# Patient Record
Sex: MALE | Race: WHITE | NOT HISPANIC OR LATINO | URBAN - METROPOLITAN AREA
[De-identification: names, ages, dates, MRNs, and addresses within clinical notes are randomized per-mention and may not be internally consistent; named-entity substitution may affect disease eponyms.]

---

## 2020-07-25 ENCOUNTER — TELEPHONE ENCOUNTER (OUTPATIENT)
Dept: URBAN - METROPOLITAN AREA CLINIC 13 | Facility: CLINIC | Age: 55
End: 2020-07-25

## 2020-07-25 RX ORDER — SUCRALFATE 1 G/1
TAKE 1 TABLET 4 TIMES DAILY, BEFORE MEALS AND AT BEDTIME TABLET ORAL
Qty: 120 | Refills: 3 | OUTPATIENT
Start: 2012-03-16 | End: 2017-02-23

## 2020-07-25 RX ORDER — POLYETHYLENE GLYCOL 3350, SODIUM CHLORIDE, SODIUM BICARBONATE AND POTASSIUM CHLORIDE WITH LEMON FLAVOR 420; 11.2; 5.72; 1.48 G/4L; G/4L; G/4L; G/4L
TAKE 1/2 GALLON AT 5:00 PM DAY BEFORE PROCEDURE, TAKE SECOND 1/2 OF GALLON 6 HRS PRIOR TO PROCEDURE POWDER, FOR SOLUTION ORAL
Qty: 1 | Refills: 0 | OUTPATIENT
Start: 2017-10-05 | End: 2018-08-29

## 2020-07-25 RX ORDER — METOPROLOL TARTRATE 75 MG/1
TAKE 1 TABLET DAILY 1 MG TABLET, FILM COATED ORAL
Refills: 0 | OUTPATIENT
End: 2018-12-28

## 2020-07-25 RX ORDER — HYOSCYAMINE SULFATE 0.12 MG/1
PLACE 1 TABLET EVERY 6 HOURS PRN ABDOMINAL PAIN PLACE UNDER TONGUE TABLET, ORALLY DISINTEGRATING ORAL
Qty: 90 | Refills: 3 | OUTPATIENT
Start: 2012-10-19 | End: 2012-11-16

## 2020-07-25 RX ORDER — AMLODIPINE BESYLATE 2.5 MG
TAKE 1 TABLET DAILY TABLET ORAL
Refills: 0 | OUTPATIENT
End: 2018-08-29

## 2020-07-26 ENCOUNTER — TELEPHONE ENCOUNTER (OUTPATIENT)
Dept: URBAN - METROPOLITAN AREA CLINIC 13 | Facility: CLINIC | Age: 55
End: 2020-07-26

## 2020-07-26 RX ORDER — METOPROLOL SUCCINATE 50 MG/1
TAKE 1 TABLET DAILY TABLET, EXTENDED RELEASE ORAL
Refills: 0 | Status: ACTIVE | COMMUNITY
Start: 2018-08-23

## 2020-07-26 RX ORDER — TESTOSTERONE CYPIONATE 200 MG/ML
USE AS DIRECTED INJECTION, SOLUTION INTRAMUSCULAR
Refills: 0 | Status: ACTIVE | COMMUNITY
Start: 2018-04-09

## 2020-07-26 RX ORDER — ESOMEPRAZOLE MAGNESIUM 40 MG
TAKE 1 CAPSULE BY MOUTH EVERY DAY CAPSULE,DELAYED RELEASE (ENTERIC COATED) ORAL
Qty: 90 | Refills: 3 | Status: ACTIVE | COMMUNITY
Start: 2012-03-16

## 2023-02-02 ENCOUNTER — OFFICE VISIT (OUTPATIENT)
Dept: URBAN - METROPOLITAN AREA CLINIC 113 | Facility: CLINIC | Age: 58
End: 2023-02-02

## 2023-02-15 ENCOUNTER — LAB OUTSIDE AN ENCOUNTER (OUTPATIENT)
Dept: URBAN - METROPOLITAN AREA CLINIC 113 | Facility: CLINIC | Age: 58
End: 2023-02-15

## 2023-02-15 ENCOUNTER — OFFICE VISIT (OUTPATIENT)
Dept: URBAN - METROPOLITAN AREA CLINIC 113 | Facility: CLINIC | Age: 58
End: 2023-02-15
Payer: COMMERCIAL

## 2023-02-15 VITALS
HEART RATE: 67 BPM | SYSTOLIC BLOOD PRESSURE: 146 MMHG | RESPIRATION RATE: 20 BRPM | WEIGHT: 245 LBS | DIASTOLIC BLOOD PRESSURE: 73 MMHG | HEIGHT: 72 IN | TEMPERATURE: 97.3 F | BODY MASS INDEX: 33.18 KG/M2

## 2023-02-15 DIAGNOSIS — K21.9 GASTROESOPHAGEAL REFLUX DISEASE WITHOUT ESOPHAGITIS: ICD-10-CM

## 2023-02-15 DIAGNOSIS — R07.89 ATYPICAL CHEST PAIN: ICD-10-CM

## 2023-02-15 DIAGNOSIS — Z12.11 COLON CANCER SCREENING: ICD-10-CM

## 2023-02-15 DIAGNOSIS — R10.12 LUQ ABDOMINAL PAIN: ICD-10-CM

## 2023-02-15 PROCEDURE — 99204 OFFICE O/P NEW MOD 45 MIN: CPT | Performed by: NURSE PRACTITIONER

## 2023-02-15 RX ORDER — ESOMEPRAZOLE MAGNESIUM 40 MG
TAKE 1 CAPSULE BY MOUTH EVERY DAY CAPSULE,DELAYED RELEASE (ENTERIC COATED) ORAL
Qty: 90 | Refills: 3 | Status: ACTIVE | COMMUNITY
Start: 2012-03-16

## 2023-02-15 RX ORDER — SUCRALFATE 1 G/1
1 TABLET TABLET ORAL
Qty: 60 | Refills: 3 | OUTPATIENT

## 2023-02-15 RX ORDER — METOPROLOL SUCCINATE 50 MG/1
TAKE 1 TABLET DAILY TABLET, EXTENDED RELEASE ORAL
Refills: 0 | Status: ACTIVE | COMMUNITY
Start: 2018-08-23

## 2023-02-15 RX ORDER — TESTOSTERONE CYPIONATE 200 MG/ML
USE AS DIRECTED INJECTION, SOLUTION INTRAMUSCULAR
Refills: 0 | Status: ACTIVE | COMMUNITY
Start: 2018-04-09

## 2023-02-15 NOTE — HPI-OTHER HISTORIES
Labs  1/5/2023:BMP normal with exception of creatinine 1.4, glucose 112.  LFTs: TB 0.4, ALP 92, ALT 61, AST 34. 12/2/2022:BMP normal with exception of creatinine 1.4, glucose 134.  LFTs: TB 1.0, , , .  CBC: WBC 12.85, hemoglobin 15.4, MCV 88, platelet 201.  Urinalysis normal with exception of small blood and 10 RBCs. CT of the abdomen and pelvis with contrast 12/2/2022:Negative for focal mass, lymphadenopathy, or inflammatory process.  Mild colonic constipation, left colonic diverticulosis, and small bowel stasis pattern.  Cholecystectomy and small fat-containing umbilical and right inguinal hernias. EGD 12/13/2012:Regular Z-line, chronic gastritis status post biopsy, normal examined duodenum, Bravo pH probe negative for pathologic reflux.  Pathology report unavailable.

## 2023-02-15 NOTE — HPI-TODAY'S VISIT:
This is a 58-year-old male with a history of asthma, hypertension, colon diverticulosis, colon cancer screening due in October 2027, GERD, and functional dyspepsia presenting for evaluation of abdominal pain. He was last seen in the office 12/28/2018 for GERD and functional dyspepsia.  He had previously been seen with complaints of postprandial palpitations following large meals.  He had undergone an unremarkable cardiac work-up.  GERD was controlled with esomeprazole 40 mg daily. He has persistent, chronic symptoms.  Once a month, for 7 days, he has pain indicated in the left anterior chest and left upper quadrant.  When this occurs, he increase his Nexium to twice a day and takes Tums.  Both of which are helpful.  When his symptoms resolve, he resumes once daily therapy.  He reports intermittent heartburn associated with these episodes.  He has persistent palpitations associated with these episodes. He had intractable nausea and vomiting in December and went to the emergency department for evaluation.  His symptoms lasted for hours.  He was asymptomatic after a few days and believes he may have had a viral illness.  He had diarrhea associated with a recent viral illness.  This has likewise resolved.  He is having regular bowel movements without red blood per rectum or melena.  He denies any other abdominal symptoms. He had a cardiac evaluation in December 2022 with Dr. Osuna.  He reports a positive treadmill stress test and a subsequent negative cardiac catheterization.

## 2023-02-28 PROBLEM — 266435005: Status: ACTIVE | Noted: 2023-02-15

## 2023-03-16 ENCOUNTER — OFFICE VISIT (OUTPATIENT)
Dept: URBAN - METROPOLITAN AREA MEDICAL CENTER 2 | Facility: MEDICAL CENTER | Age: 58
End: 2023-03-16
Payer: COMMERCIAL

## 2023-03-16 DIAGNOSIS — K31.89 ACQUIRED DEFORMITY OF DUODENUM: ICD-10-CM

## 2023-03-16 DIAGNOSIS — R07.89 ACUTE CHEST WALL PAIN: ICD-10-CM

## 2023-03-16 DIAGNOSIS — K31.7 BENIGN GASTRIC POLYP: ICD-10-CM

## 2023-03-16 DIAGNOSIS — K21.9 ACID REFLUX: ICD-10-CM

## 2023-03-16 PROCEDURE — 43239 EGD BIOPSY SINGLE/MULTIPLE: CPT | Performed by: INTERNAL MEDICINE

## 2023-03-16 RX ORDER — TESTOSTERONE CYPIONATE 200 MG/ML
USE AS DIRECTED INJECTION, SOLUTION INTRAMUSCULAR
Refills: 0 | Status: ACTIVE | COMMUNITY
Start: 2018-04-09

## 2023-03-16 RX ORDER — ESOMEPRAZOLE MAGNESIUM 40 MG
TAKE 1 CAPSULE BY MOUTH EVERY DAY CAPSULE,DELAYED RELEASE (ENTERIC COATED) ORAL
Qty: 90 | Refills: 3 | Status: ACTIVE | COMMUNITY
Start: 2012-03-16

## 2023-03-16 RX ORDER — METOPROLOL SUCCINATE 50 MG/1
TAKE 1 TABLET DAILY TABLET, EXTENDED RELEASE ORAL
Refills: 0 | Status: ACTIVE | COMMUNITY
Start: 2018-08-23

## 2023-03-16 RX ORDER — SUCRALFATE 1 G/1
1 TABLET TABLET ORAL
Qty: 60 | Refills: 3 | Status: ACTIVE | COMMUNITY

## 2023-06-15 ENCOUNTER — DASHBOARD ENCOUNTERS (OUTPATIENT)
Age: 58
End: 2023-06-15

## 2023-06-15 ENCOUNTER — OFFICE VISIT (OUTPATIENT)
Dept: URBAN - METROPOLITAN AREA CLINIC 113 | Facility: CLINIC | Age: 58
End: 2023-06-15
Payer: COMMERCIAL

## 2023-06-15 VITALS
TEMPERATURE: 97.4 F | SYSTOLIC BLOOD PRESSURE: 131 MMHG | HEART RATE: 55 BPM | RESPIRATION RATE: 20 BRPM | WEIGHT: 246.2 LBS | HEIGHT: 72 IN | BODY MASS INDEX: 33.35 KG/M2 | DIASTOLIC BLOOD PRESSURE: 81 MMHG

## 2023-06-15 DIAGNOSIS — K21.9 GASTROESOPHAGEAL REFLUX DISEASE WITHOUT ESOPHAGITIS: ICD-10-CM

## 2023-06-15 DIAGNOSIS — R74.8 ELEVATED LIVER ENZYMES: ICD-10-CM

## 2023-06-15 DIAGNOSIS — R07.89 ATYPICAL CHEST PAIN: ICD-10-CM

## 2023-06-15 PROCEDURE — 99214 OFFICE O/P EST MOD 30 MIN: CPT | Performed by: NURSE PRACTITIONER

## 2023-06-15 RX ORDER — ACEBUTOLOL HYDROCHLORIDE 200 MG/1
1 CAPSULE CAPSULE ORAL ONCE A DAY
Status: ACTIVE | COMMUNITY

## 2023-06-15 RX ORDER — TESTOSTERONE CYPIONATE 200 MG/ML
USE AS DIRECTED INJECTION, SOLUTION INTRAMUSCULAR
Refills: 0 | Status: ON HOLD | COMMUNITY
Start: 2018-04-09

## 2023-06-15 RX ORDER — ESOMEPRAZOLE MAGNESIUM 40 MG
TAKE 1 CAPSULE BY MOUTH EVERY DAY CAPSULE,DELAYED RELEASE (ENTERIC COATED) ORAL
Qty: 90 | Refills: 3 | Status: ACTIVE | COMMUNITY
Start: 2012-03-16

## 2023-06-15 RX ORDER — SUCRALFATE 1 G/1
1 TABLET TABLET ORAL
Qty: 60 | Refills: 3 | Status: ON HOLD | COMMUNITY

## 2023-06-15 RX ORDER — ATORVASTATIN CALCIUM 10 MG/1
1/2 TABLET TABLET, FILM COATED ORAL ONCE A DAY
Status: ACTIVE | COMMUNITY

## 2023-06-15 RX ORDER — METOPROLOL SUCCINATE 50 MG/1
TAKE 1 TABLET DAILY TABLET, EXTENDED RELEASE ORAL
Refills: 0 | Status: ON HOLD | COMMUNITY
Start: 2018-08-23

## 2023-06-15 NOTE — HPI-TODAY'S VISIT:
This is a 58-year-old male with a history of asthma, hypertension, colon diverticulosis, colon cancer screening due in October 2027, GERD, functional dyspepsia, left upper quadrant abdominal pain, and atypical chest pain presenting for follow-up. He was last seen 2/15/2023.  Acid reflux symptoms are usually controlled with esomeprazole 40 mg daily.  He had exacerbations of atypical chest pain, left upper quadrant pain, and heartburn lasting 7 days occurring once a month.  He increase Nexium to twice a day during these episodes and use Tums which were both helpful.  He was scheduled for an EGD.  He was prescribed Carafate for symptomatic relief during these episodes. EGD 3/16/2023:Normal esophagus, multiple gastric polyps status postbiopsy, chronic gastritis status post biopsy, normal examined duodenum.  Pathology: Antral biopsies demonstrated normal antral mucosa.  No H. pylori identified.  Gastric polyp biopsy demonstrated fundic gland polyp. He is taking esomeprazole 40 mg daily.  He reports only 1 episode of pain in his chest and left upper quadrant recently.  This was relieved with taking Tums.  Acid reflux symptoms are well controlled.  He denies other abdominal symptoms.  Nausea has resolved since he decreased atorvastatin to 1/2 tablet daily. He drinks alcohol once every 2 weeks or less.  He may have a few beverages when he drinks.  He has a prior history of liver enzyme elevation.  Review of his record reveals an evaluation in 2010 by Dr. Moncada during which he was screened for autoimmune, viral, and hereditary sources of liver disease.  This evaluation was unremarkable.  He is also concerned regarding a creatinine of 1.4 on prior labs.

## 2023-06-15 NOTE — HPI-OTHER HISTORIES
EGD 3/16/2023:Normal esophagus, multiple gastric polyps status postbiopsy, chronic gastritis status post biopsy, normal examined duodenum.  Pathology: Antral biopsies demonstrated normal antral mucosa.  No H. pylori identified.  Gastric polyp biopsy demonstrated fundic gland polyp. Labs  4/14/2023:Urinalysis normal.  BMP normal with exception of creatinine 1.4.  LFTs: TB 0.6, ALP 99, ALT 38, AST 36.  Lipid panel normal with exception of HDL 36.  PSA 0.7.  Thyroid panel normal.  CBC: WBC four 8.58, hemoglobin 14.5, MCV 89.3, platelet 256.Calculated FIB 4 - 1.28 (low risk for fibrosis) 1/5/2023:BMP normal with exception of creatinine 1.4, glucose 112.  LFTs: TB 0.4, ALP 92, ALT 61, AST 34. 12/2/2022:BMP normal with exception of creatinine 1.4, glucose 134.  LFTs: TB 1.0, , , .  CBC: WBC 12.85, hemoglobin 15.4, MCV 88, platelet 201.  Urinalysis normal with exception of small blood and 10 RBCs. CT of the abdomen and pelvis with contrast 12/2/2022:Negative for focal mass, lymphadenopathy, or inflammatory process.  Mild colonic constipation, left colonic diverticulosis, and small bowel stasis pattern.  Cholecystectomy and small fat-containing umbilical and right inguinal hernias. Liver normal. EGD 12/13/2012:Regular Z-line, chronic gastritis status post biopsy, normal examined duodenum, Bravo pH probe negative for pathologic reflux.  Pathology report unavailable.

## 2023-06-16 LAB
A/G RATIO: 2.2
ALBUMIN: 4.3
ALKALINE PHOSPHATASE: 79
ALT (SGPT): 43
AST (SGOT): 28
BILIRUBIN, TOTAL: 0.4
BUN/CREATININE RATIO: (no result)
BUN: 16
CALCIUM: 9.2
CARBON DIOXIDE, TOTAL: 25
CHLORIDE: 105
CREATININE: 1.26
EGFR: 67
GLOBULIN, TOTAL: 2
GLUCOSE: 90
POTASSIUM: 4.2
PROTEIN, TOTAL: 6.3
SODIUM: 139

## 2023-07-07 ENCOUNTER — TELEPHONE ENCOUNTER (OUTPATIENT)
Dept: URBAN - METROPOLITAN AREA CLINIC 113 | Facility: CLINIC | Age: 58
End: 2023-07-07

## 2023-07-31 ENCOUNTER — TELEPHONE ENCOUNTER (OUTPATIENT)
Dept: URBAN - METROPOLITAN AREA CLINIC 23 | Facility: CLINIC | Age: 58
End: 2023-07-31

## 2024-07-05 ENCOUNTER — OFFICE VISIT (OUTPATIENT)
Dept: URBAN - METROPOLITAN AREA CLINIC 113 | Facility: CLINIC | Age: 59
End: 2024-07-05

## 2024-07-05 RX ORDER — TESTOSTERONE CYPIONATE 200 MG/ML
USE AS DIRECTED INJECTION, SOLUTION INTRAMUSCULAR
Refills: 0 | Status: ON HOLD | COMMUNITY
Start: 2018-04-09

## 2024-07-05 RX ORDER — ESOMEPRAZOLE MAGNESIUM 40 MG
TAKE 1 CAPSULE BY MOUTH EVERY DAY CAPSULE,DELAYED RELEASE (ENTERIC COATED) ORAL
Qty: 90 | Refills: 3 | Status: ACTIVE | COMMUNITY
Start: 2012-03-16

## 2024-07-05 RX ORDER — METOPROLOL SUCCINATE 50 MG/1
TAKE 1 TABLET DAILY TABLET, EXTENDED RELEASE ORAL
Refills: 0 | Status: ON HOLD | COMMUNITY
Start: 2018-08-23

## 2024-07-05 RX ORDER — ATORVASTATIN CALCIUM 10 MG/1
1/2 TABLET TABLET, FILM COATED ORAL ONCE A DAY
Status: ACTIVE | COMMUNITY

## 2024-07-05 RX ORDER — SUCRALFATE 1 G/1
1 TABLET TABLET ORAL
Qty: 60 | Refills: 3 | Status: ON HOLD | COMMUNITY

## 2024-07-05 RX ORDER — ACEBUTOLOL HYDROCHLORIDE 200 MG/1
1 CAPSULE CAPSULE ORAL ONCE A DAY
Status: ACTIVE | COMMUNITY

## 2024-07-05 NOTE — HPI-TODAY'S VISIT:
This is a 59-year-old male with a history of asthma, hypertension, colon diverticulosis, up-to-date colon cancer screening due in 2027, GERD, functional dyspepsia, left upper quadrant abdominal pain, atypical chest pain, and elevated liver enzymes presenting for follow-up.  He was last seen 6/15/2023.  GERD was controlled with esomeprazole 40 mg daily.  He had undergone a recent unremarkable EGD.  Episodes of pain in his chest and left upper quadrant were infrequent and relieved with antacids.  Labs demonstrated liver enzyme elevation.  Evaluation for chronic sources of liver disease in 2010 were negative.  Calculated fib 4 (1.28) with recent labs indicating low risk for fibrosis.  Imaging over the years had been negative for liver abnormality.  He had not significant elevation in December 2022 which was likely reactive (in the emergency department at the time with a mild leukocytosis).  Statin side effect was also likely contributing.  Values have improved after decreasing dose by half.  It was also possible that he had fatty liver.  Risk factors for fatty liver were discussed.  He was to continue efforts to control cholesterol and lose weight.  Recent values were near normal.  Labs were rechecked.  Creatinine was noted to be 1.4.  This has been consistent since December.  GFR was slightly low.  It was discussed that medications may be contributing.  He was to discuss this with Dr. Botello at his next visit.  Labs 6/15/2023: BMP normal.  LFTs normal TB 0.4, ALP 79, ALT 43, AST 28.

## 2024-08-19 ENCOUNTER — TELEPHONE ENCOUNTER (OUTPATIENT)
Dept: URBAN - METROPOLITAN AREA CLINIC 113 | Facility: CLINIC | Age: 59
End: 2024-08-19

## 2024-08-19 VITALS — HEIGHT: 60 IN | BODY MASS INDEX: 47.71 KG/M2 | WEIGHT: 243 LBS

## 2024-09-05 ENCOUNTER — OFFICE VISIT (OUTPATIENT)
Dept: URBAN - METROPOLITAN AREA CLINIC 113 | Facility: CLINIC | Age: 59
End: 2024-09-05
Payer: COMMERCIAL

## 2024-09-05 ENCOUNTER — LAB OUTSIDE AN ENCOUNTER (OUTPATIENT)
Dept: URBAN - METROPOLITAN AREA CLINIC 113 | Facility: CLINIC | Age: 59
End: 2024-09-05

## 2024-09-05 VITALS
SYSTOLIC BLOOD PRESSURE: 122 MMHG | RESPIRATION RATE: 20 BRPM | TEMPERATURE: 98.1 F | DIASTOLIC BLOOD PRESSURE: 73 MMHG | HEIGHT: 60 IN | WEIGHT: 250 LBS | HEART RATE: 54 BPM | BODY MASS INDEX: 49.08 KG/M2

## 2024-09-05 DIAGNOSIS — K21.9 GASTROESOPHAGEAL REFLUX DISEASE WITHOUT ESOPHAGITIS: ICD-10-CM

## 2024-09-05 DIAGNOSIS — K57.30 COLON, DIVERTICULOSIS: ICD-10-CM

## 2024-09-05 DIAGNOSIS — R10.32 LLQ ABDOMINAL PAIN: ICD-10-CM

## 2024-09-05 DIAGNOSIS — R74.8 ELEVATED LIVER ENZYMES: ICD-10-CM

## 2024-09-05 PROBLEM — 301715003: Status: ACTIVE | Noted: 2024-09-05

## 2024-09-05 PROBLEM — 301716002: Status: ACTIVE | Noted: 2024-09-05

## 2024-09-05 PROCEDURE — 99214 OFFICE O/P EST MOD 30 MIN: CPT | Performed by: NURSE PRACTITIONER

## 2024-09-05 RX ORDER — ESOMEPRAZOLE MAGNESIUM 40 MG
TAKE 1 CAPSULE BY MOUTH EVERY DAY CAPSULE,DELAYED RELEASE (ENTERIC COATED) ORAL
Qty: 90 | Refills: 3 | Status: ACTIVE | COMMUNITY
Start: 2012-03-16

## 2024-09-05 RX ORDER — SUCRALFATE 1 G/1
1 TABLET TABLET ORAL
Qty: 60 | Refills: 3 | Status: ON HOLD | COMMUNITY

## 2024-09-05 RX ORDER — ESOMEPRAZOLE MAGNESIUM 40 MG
1 CAPSULE CAPSULE,DELAYED RELEASE (ENTERIC COATED) ORAL TWICE DAILY
Qty: 180 | Refills: 1
Start: 2012-03-16

## 2024-09-05 RX ORDER — ACEBUTOLOL HYDROCHLORIDE 200 MG/1
1 CAPSULE CAPSULE ORAL ONCE A DAY
Status: ACTIVE | COMMUNITY

## 2024-09-05 RX ORDER — ATORVASTATIN CALCIUM 10 MG/1
1/2 TABLET TABLET, FILM COATED ORAL ONCE A DAY
Status: ACTIVE | COMMUNITY

## 2024-09-05 RX ORDER — METOPROLOL SUCCINATE 50 MG/1
TAKE 1 TABLET DAILY TABLET, EXTENDED RELEASE ORAL
Refills: 0 | Status: ON HOLD | COMMUNITY
Start: 2018-08-23

## 2024-09-05 RX ORDER — TESTOSTERONE CYPIONATE 200 MG/ML
USE AS DIRECTED INJECTION, SOLUTION INTRAMUSCULAR
Refills: 0 | Status: ON HOLD | COMMUNITY
Start: 2018-04-09

## 2024-09-05 NOTE — HPI-OTHER HISTORIES
Abdominal ultrasound 5/8/2024:Prior cholecystectomy similar to the prior CT abdomen 12/2/2022, new from the prior gallbladder ultrasound 6/8/2011. No abdominal mass, fluid collection, or biliary dilation. Liver remains normal in size and echotexture without focal hepatic abnormality. No other abnormality; unchanged.  Labs 4/22/2024 CBC:WBC 9.13, hemoglobin 14.7, MCV 90, platelet 264. BMP normal with exception of creatinine 1.4, glucose 58. LFTs: TB 0.7, ALP 72, ALT 24, AST 84. Lipid panel normal with the exception of HDL 34. TSH 1.488. The remainder of thyroid panel normal. Testosterone 118. Urinalysis normal. Hepatitis A IgM antibody, hepatitis B surface antigen, hepatitis B core antibody, hepatitis C antibody negative/nonreactive.  Labs 6/15/2023: BMP normal. LFTs normal TB 0.4, ALP 79, ALT 43, AST 28.  EGD 3/16/2023:Normal esophagus, multiple gastric polyps status postbiopsy, chronic gastritis status post biopsy, normal examined duodenum.  Pathology: Antral biopsies demonstrated normal antral mucosa.  No H. pylori identified.  Gastric polyp biopsy demonstrated fundic gland polyp. Labs  4/14/2023:Urinalysis normal.  BMP normal with exception of creatinine 1.4.  LFTs: TB 0.6, ALP 99, ALT 38, AST 36.  Lipid panel normal with exception of HDL 36.  PSA 0.7.  Thyroid panel normal.  CBC: WBC four 8.58, hemoglobin 14.5, MCV 89.3, platelet 256.Calculated FIB 4 - 1.28 (low risk for fibrosis) 1/5/2023:BMP normal with exception of creatinine 1.4, glucose 112.  LFTs: TB 0.4, ALP 92, ALT 61, AST 34. 12/2/2022:BMP normal with exception of creatinine 1.4, glucose 134.  LFTs: TB 1.0, , , .  CBC: WBC 12.85, hemoglobin 15.4, MCV 88, platelet 201.  Urinalysis normal with exception of small blood and 10 RBCs. CT of the abdomen and pelvis with contrast 12/2/2022:Negative for focal mass, lymphadenopathy, or inflammatory process.  Mild colonic constipation, left colonic diverticulosis, and small bowel stasis pattern.  Cholecystectomy and small fat-containing umbilical and right inguinal hernias. Liver normal. EGD 12/13/2012:Regular Z-line, chronic gastritis status post biopsy, normal examined duodenum, Bravo pH probe negative for pathologic reflux.  Pathology report unavailable.

## 2025-02-27 ENCOUNTER — TELEPHONE ENCOUNTER (OUTPATIENT)
Dept: URBAN - METROPOLITAN AREA CLINIC 113 | Facility: CLINIC | Age: 60
End: 2025-02-27

## 2025-03-17 ENCOUNTER — OFFICE VISIT (OUTPATIENT)
Dept: URBAN - METROPOLITAN AREA CLINIC 113 | Facility: CLINIC | Age: 60
End: 2025-03-17
Payer: COMMERCIAL

## 2025-03-17 ENCOUNTER — LAB OUTSIDE AN ENCOUNTER (OUTPATIENT)
Dept: URBAN - METROPOLITAN AREA CLINIC 113 | Facility: CLINIC | Age: 60
End: 2025-03-17

## 2025-03-17 VITALS
HEART RATE: 61 BPM | SYSTOLIC BLOOD PRESSURE: 125 MMHG | HEIGHT: 60 IN | BODY MASS INDEX: 49.71 KG/M2 | WEIGHT: 253.2 LBS | DIASTOLIC BLOOD PRESSURE: 68 MMHG | RESPIRATION RATE: 16 BRPM

## 2025-03-17 DIAGNOSIS — Z12.11 COLON CANCER SCREENING: ICD-10-CM

## 2025-03-17 DIAGNOSIS — K21.9 GERD WITHOUT ESOPHAGITIS: ICD-10-CM

## 2025-03-17 PROBLEM — 266435005: Status: ACTIVE | Noted: 2025-03-17

## 2025-03-17 PROCEDURE — 99213 OFFICE O/P EST LOW 20 MIN: CPT | Performed by: NURSE PRACTITIONER

## 2025-03-17 RX ORDER — ESOMEPRAZOLE MAGNESIUM 40 MG
1 CAPSULE CAPSULE,DELAYED RELEASE (ENTERIC COATED) ORAL ONCE A DAY
Qty: 90 CAPSULE | Refills: 1 | Status: ACTIVE | COMMUNITY
Start: 2012-03-16

## 2025-03-17 RX ORDER — METOPROLOL SUCCINATE 50 MG/1
TAKE 1 TABLET DAILY TABLET, EXTENDED RELEASE ORAL
Refills: 0 | Status: ON HOLD | COMMUNITY
Start: 2018-08-23

## 2025-03-17 RX ORDER — ACEBUTOLOL HYDROCHLORIDE 200 MG/1
1 CAPSULE CAPSULE ORAL ONCE A DAY
Status: ACTIVE | COMMUNITY

## 2025-03-17 RX ORDER — TESTOSTERONE CYPIONATE 200 MG/ML
USE AS DIRECTED INJECTION, SOLUTION INTRAMUSCULAR
Refills: 0 | Status: ON HOLD | COMMUNITY
Start: 2018-04-09

## 2025-03-17 RX ORDER — ATORVASTATIN CALCIUM 10 MG/1
1/2 TABLET TABLET, FILM COATED ORAL ONCE A DAY
Status: ACTIVE | COMMUNITY

## 2025-03-17 RX ORDER — SUCRALFATE 1 G/1
1 TABLET TABLET ORAL
Qty: 60 | Refills: 3 | Status: ON HOLD | COMMUNITY

## 2025-03-17 NOTE — HPI-OTHER HISTORIES
Labs 2/20/2025 PSA 0.810. TSH 1.34. Testosterone 275. BMP normal with exception of chloride 108, creatinine 1.54. LFTs normal TB 0.4, ALP 63, ALT 30, AST 22. Lipid panel normal with exception of HDL 33. A1c 5.8. CBC: WBC 8.6, hemoglobin 14.5, MCV 85.3, platelet 239.  CT abdomen and pelvis with contrast 9/17/2024:Colonic diverticulosis without convincing evidence of acute diverticulitis. Liver normal.  Abdominal ultrasound 5/8/2024:Prior cholecystectomy similar to the prior CT abdomen 12/2/2022, new from the prior gallbladder ultrasound 6/8/2011. No abdominal mass, fluid collection, or biliary dilation. Liver remains normal in size and echotexture without focal hepatic abnormality. No other abnormality; unchanged.  Labs 4/22/2024 CBC:WBC 9.13, hemoglobin 14.7, MCV 90, platelet 264. BMP normal with exception of creatinine 1.4, glucose 58. LFTs: TB 0.7, ALP 72, ALT 24, AST 84. Lipid panel normal with the exception of HDL 34. TSH 1.488. The remainder of thyroid panel normal. Testosterone 118. Urinalysis normal. Hepatitis A IgM antibody, hepatitis B surface antigen, hepatitis B core antibody, hepatitis C antibody negative/nonreactive.  Labs 6/15/2023: BMP normal. LFTs normal TB 0.4, ALP 79, ALT 43, AST 28.  EGD 3/16/2023:Normal esophagus, multiple gastric polyps status postbiopsy, chronic gastritis status post biopsy, normal examined duodenum.  Pathology: Antral biopsies demonstrated normal antral mucosa.  No H. pylori identified.  Gastric polyp biopsy demonstrated fundic gland polyp. Labs  4/14/2023:Urinalysis normal.  BMP normal with exception of creatinine 1.4.  LFTs: TB 0.6, ALP 99, ALT 38, AST 36.  Lipid panel normal with exception of HDL 36.  PSA 0.7.  Thyroid panel normal.  CBC: WBC four 8.58, hemoglobin 14.5, MCV 89.3, platelet 256.Calculated FIB 4 - 1.28 (low risk for fibrosis) 1/5/2023:BMP normal with exception of creatinine 1.4, glucose 112.  LFTs: TB 0.4, ALP 92, ALT 61, AST 34. 12/2/2022:BMP normal with exception of creatinine 1.4, glucose 134.  LFTs: TB 1.0, , , .  CBC: WBC 12.85, hemoglobin 15.4, MCV 88, platelet 201.  Urinalysis normal with exception of small blood and 10 RBCs. CT of the abdomen and pelvis with contrast 12/2/2022:Negative for focal mass, lymphadenopathy, or inflammatory process.  Mild colonic constipation, left colonic diverticulosis, and small bowel stasis pattern.  Cholecystectomy and small fat-containing umbilical and right inguinal hernias. Liver normal. EGD 12/13/2012:Regular Z-line, chronic gastritis status post biopsy, normal examined duodenum, Bravo pH probe negative for pathologic reflux.  Pathology report unavailable.

## 2025-03-17 NOTE — HPI-TODAY'S VISIT:
This is a 60-year-old male with a history of asthma, hypertension, colon diverticulosis, up-to-date colon cancer screening due in 2027, GERD, functional dyspepsia, left upper quadrant abdominal pain, atypical chest pain, and liver enzyme elevation presenting as a referral from Dr. Hess for colon cancer screening.  He was last seen in the office 9/5/2024.  Regarding GERD, he had been asymptomatic on esomeprazole 40 mg daily.  He had a recent onset of intermittent left quadrant abdominal pain that improved some since he decreased frequency of low-dose aspirin.  It was doubtful that he had peptic ulcer disease given PPI therapy.  He was to increase esomeprazole to twice a day.  He was complaining of left lower quadrant abdominal pain and was mildly tender on exam.  A functional etiology was suspected.  A CT was scheduled to assess for an etiology of left upper and lower quadrant abdominal pain.  Liver enzyme elevation was intermittent.  Evaluation for chronic sources of liver disease in 2010 were negative.  Imaging over the years, including recent ultrasound have been negative for liver abnormalities.  Recent labs notable for an elevation in AST.  Statin side effect was a consideration.  It was also possible that he had fatty liver.  Continued monitoring was recommended.  Labs 2/20/2025 PSA 0.810.  TSH 1.34.  Testosterone 275.  BMP normal with exception of chloride 108, creatinine 1.54.  LFTs normal TB 0.4, ALP 63, ALT 30, AST 22.  Lipid panel normal with exception of HDL 33.  A1c 5.8.  CBC: WBC 8.6, hemoglobin 14.5, MCV 85.3, platelet 239.  CT abdomen and pelvis with contrast 9/17/2024:Colonic diverticulosis without convincing evidence of acute diverticulitis.  Liver normal.  He is taking esomeprazole daily and is elevating his head of bed with pillows.  Acid reflux is primarily controlled.  He denies abdominal pain, red blood per rectum, or a change in bowel habits.  Had a friend who was recently diagnosed with colon cancer causing him some concern regarding 10-year interval for colon cancer screening. GERD is controlled with esomeprazole daily.

## 2025-04-09 ENCOUNTER — TELEPHONE ENCOUNTER (OUTPATIENT)
Dept: URBAN - METROPOLITAN AREA CLINIC 113 | Facility: CLINIC | Age: 60
End: 2025-04-09

## 2025-04-14 ENCOUNTER — ERX REFILL RESPONSE (OUTPATIENT)
Dept: URBAN - METROPOLITAN AREA CLINIC 113 | Facility: CLINIC | Age: 60
End: 2025-04-14

## 2025-04-14 RX ORDER — ESOMEPRAZOLE MAGNESIUM 40 MG
1 CAPSULE CAPSULE,DELAYED RELEASE (ENTERIC COATED) ORAL ONCE A DAY
Qty: 90 CAPSULE | Refills: 3

## 2025-04-15 ENCOUNTER — OFFICE VISIT (OUTPATIENT)
Dept: URBAN - METROPOLITAN AREA SURGERY CENTER 25 | Facility: SURGERY CENTER | Age: 60
End: 2025-04-15

## 2025-05-13 ENCOUNTER — OFFICE VISIT (OUTPATIENT)
Dept: URBAN - METROPOLITAN AREA CLINIC 112 | Facility: CLINIC | Age: 60
End: 2025-05-13

## 2025-06-26 ENCOUNTER — TELEPHONE ENCOUNTER (OUTPATIENT)
Dept: URBAN - METROPOLITAN AREA CLINIC 112 | Facility: CLINIC | Age: 60
End: 2025-06-26

## 2025-06-30 ENCOUNTER — OFFICE VISIT (OUTPATIENT)
Dept: URBAN - METROPOLITAN AREA MEDICAL CENTER 2 | Facility: MEDICAL CENTER | Age: 60
End: 2025-06-30

## 2025-07-23 ENCOUNTER — OFFICE VISIT (OUTPATIENT)
Dept: URBAN - METROPOLITAN AREA CLINIC 113 | Facility: CLINIC | Age: 60
End: 2025-07-23

## 2025-08-29 ENCOUNTER — TELEPHONE ENCOUNTER (OUTPATIENT)
Dept: URBAN - METROPOLITAN AREA CLINIC 113 | Facility: CLINIC | Age: 60
End: 2025-08-29